# Patient Record
Sex: FEMALE | Race: WHITE | NOT HISPANIC OR LATINO | Employment: OTHER | URBAN - METROPOLITAN AREA
[De-identification: names, ages, dates, MRNs, and addresses within clinical notes are randomized per-mention and may not be internally consistent; named-entity substitution may affect disease eponyms.]

---

## 2020-02-07 ENCOUNTER — HOSPITAL ENCOUNTER (OUTPATIENT)
Dept: MRI IMAGING | Facility: HOSPITAL | Age: 65
Discharge: HOME | End: 2020-02-07

## 2020-06-11 ENCOUNTER — HOSPITAL ENCOUNTER (OUTPATIENT)
Dept: MRI IMAGING | Facility: HOSPITAL | Age: 65
Discharge: HOME | End: 2020-06-11

## 2020-07-07 ENCOUNTER — OFFICE VISIT CONVERTED (OUTPATIENT)
Dept: CARDIOLOGY | Facility: CLINIC | Age: 65
End: 2020-07-07
Attending: SPECIALIST

## 2020-07-07 ENCOUNTER — CONVERSION ENCOUNTER (OUTPATIENT)
Dept: CARDIOLOGY | Facility: CLINIC | Age: 65
End: 2020-07-07

## 2020-07-24 ENCOUNTER — CONVERSION ENCOUNTER (OUTPATIENT)
Dept: CARDIOLOGY | Facility: CLINIC | Age: 65
End: 2020-07-24
Attending: SPECIALIST

## 2020-08-19 ENCOUNTER — HOSPITAL ENCOUNTER (OUTPATIENT)
Dept: NUCLEAR MEDICINE | Facility: HOSPITAL | Age: 65
Discharge: HOME | End: 2020-08-19
Attending: SPECIALIST

## 2021-05-11 NOTE — H&P
History and Physical      Patient Name: Robyn Go   Patient ID: [id number]   Sex: Female   YOB: 1955    Primary Care Provider: Anastasia WALLACE   Referring Provider: Anastasia WALLACE    Visit Date: July 7, 2020    Provider: Dez Riggs MD   Location: [facility]   Location Address: [address] [place] [address], [address]   [place]  [id number]   Location Phone: [phone]          Chief Complaint     Chest pain.       History Of Present Illness  Consult requested by: Anastasia WALLACE   Robyn Go is a 65 year old female with a history of COPD who has been having some chest pain on and off for the last couple of years, substernal, lasts for about 10 minutes, relieves spontaneously, nonexertional, no aggravating or alleviating factors. She has shortness of breath on exertion. No PND. No orthopnea.   PAST MEDICAL HISTORY: Anxiety; COPD; Degenerative disc disease; Hyperlipidemia; Hypertension.   FAMILY HISTORY: Positive for diabetes mellitus, hypertension, and heart disease.   PSYCHOSOCIAL HISTORY: Current smoker of one pack per day. Denies alcohol use. Daily caffeine intake. . Denies mood changes or depression.   CURRENT MEDICATIONS: Albuterol; atorvastatin 40 mg q. h.s.; buprenorphine HCl 8-2 mg sublingual 2 tablets daily; clonidine 0.2 mg daily; duloxetine 60 mg daily; famotidine 40 mg b.i.d.; furosemide 20 mg daily p.r.n. swelling; gabapentin 300 mg t.i.d.; isosorbide mononitrate ER 30 mg daily; Mucinex; Nicotine transdermal patch; Nystatin 100,000 units/gm powder b.i.d.; Ventolin HFA. Dosage and frequency of the medication(s) reviewed with the patient.   ALLERGIES: No known drug allergies.   CHOLESTEROL STATUS: Unknown.       Review of Systems  · Constitutional  o Admits  o : fatigue  o Denies  o : good general health lately, recent weight changes   · Eyes  o Admits  o : blurred vision  · HENT  o Admits  o : hearing loss or ringing, chronic sinus  "problem  o Denies  o : swollen glands in neck  · Cardiovascular  o Admits  o : chest pain, palpitations (fast, fluttering, or skipping beats), swelling (feet, ankles, hands), shortness of breath while walking or lying flat  · Respiratory  o Admits  o : asthma or wheezing, COPD  · Gastrointestinal  o Denies  o : ulcers, nausea or vomiting  · Neurologic  o Admits  o : lightheaded or dizzy  o Denies  o : stroke, headaches  · Musculoskeletal  o Admits  o : joint pain, back pain  · Endocrine  o Admits  o : heat or cold intolerance  o Denies  o : thyroid disease, diabetes, excessive thirst or urination  · Heme-Lymph  o Denies  o : bleeding or bruising tendency, anemia      Vitals  Date Time BP Position Site L\R Cuff Size HR RR TEMP (F) WT  HT  BMI kg/m2 BSA m2 O2 Sat HC       07/07/2020 01:10 /60 Sitting    70 - R   147lbs 16oz 5'  6\" 23.89 1.77           Physical Examination  · Constitutional  o Appearance  o : Awake, alert, cooperative, pleasant.  · Eyes  o Pupils and Irises  o : Pupils equal and reacting to light and accommodation.  · Ears, Nose, Mouth and Throat  o Ears  o : Tympanic membranes are normal.  o Nose  o : Clear with no maxillary tenderness.  o Oral Cavity  o : Clear.  · Neck  o Inspection/Palpation  o : No JVD, bruits, thyromegaly, or adenopathy. Trachea midline. No axillary or cervical lymphadenopathy.  o Thyroid  o : No thyroid enlargement.   · Respiratory  o Inspection of Chest  o : No chest wall deformities, moving equal.  o Auscultation of Lungs  o : Good air entry with vesicular breath sounds.  o Percussion of Chest  o : Resonant bilaterally.   o Palpation of Chest  o : Equal movements bilaterally.  · Cardiovascular  o Heart  o :   § Auscultation of Heart  § : PMI is in the 5th intercostal space. S1 and S2 regular. No S3. No S4.   o Peripheral Vascular System  o :   § Extremities  § : No pedal edema. Peripheral pulses well felt. No cyanosis.  · Gastrointestinal  o Abdominal Examination  o : No " organomegaly, masses, tenderness, bruits, or abnormal pulsations. Bowel sounds are normal.  · Musculoskeletal  o General  o : Muscle strength is normal with normal tone.  · Skin and Subcutaneous Tissue  o General Inspection  o : No rash, petechiae, or suspicious skin lesions. Skin turgor is normal.  · EKG  o EKG  o : Prior EKG reviewed, which shows sinus rhythm, within normal limits.          Assessment     ASSESSMENT & PLAN:    1.  Precordial chest pain and positive risk factors.  In view of her precordial chest pain and positive risk factors,        we will do a Lexiscan stress test to rule out any significant ischemia.  Cannot walk on a treadmill because        of shortness of breath.    2.  Positive nicotine use.  Smoking cessation instructions were discussed with the patient.  3.  Shortness of breath.  We will do an echocardiogram to evaluate left ventricular systolic function.      Dez Riggs MD, [facility]  SHANI:vm             Electronically Signed by: Dinah Colorado-, Other -Author on July 9, 2020 11:35:27 AM  Electronically Co-signed by: Dez Riggs MD -Reviewer on July 13, 2020 08:33:08 AM

## 2021-05-11 NOTE — PROCEDURES
"   Procedure Note      Patient Name: Robyn Go   Patient ID: [id number]   Sex: Female   YOB: 1955    Primary Care Provider: Anastasia WALLACE   Referring Provider: Anastasia WALLACE    Visit Date: July 24, 2020    Provider: Dez Riggs MD   Location: [facility]   Location Address: [address] [place] [address], [address]   [place]  [id number]   Location Phone: [phone]          FINAL REPORT   TRANSTHORACIC ECHOCARDIOGRAM REPORT    Diagnosis: Shortness of breath   Height: 5'6\" Weight: 148 B/P: 120/60 BSA: 1.8   Tech: BNS   MEASUREMENTS:  RVID (Diastole) : RVID. (NORMAL: 0.7 to 2.4 cm max)   LVID (Systole): 3.7 cm (Diastole): 5.3 cm . (NORMAL: 3.7 - 5.4 cm)   Posterior Wall Thickness (Diastole): 0.9 cm. (NORMAL: 0.8 - 1.1 cm)   Septal Thickness (Diastole): 0.8 cm. (NORMAL: 0.7 - 1.2 cm)   LAID (Systole): 3.1 cm. (NORMAL: 1.9 - 3.8 cm)   Aortic Root Diameter (Diastole): 2.7 cm. (NORMAL: 2.0 - 3.7 cm)   DOPPLER:  E/A ratio 1.1 (NORMAL 0.8-2.0)   DT: 158 msec (NORMAL 140-240 msec.)   IVRT 69 m/sec (NORMAL  m/sec.)   E/E': 9 (NORMAL <8 avg.)   COMMENTS:  The patient underwent 2-D, M-Mode, and Doppler examination, including pulse-wave, continuous-wave, and color-flow analysis; the study is technically adequate. The following was observed:   FINDINGS:  AORTIC VALVE: Normal. Tricuspid in appearance with normal central closure.   MITRAL VALVE: Normal. Bicuspid in appearance.   TRICUSPID VALVE: Normal.   PULMONIC VALVE: Not well visualized.   LEFT ATRIUM: Normal. No intracavitary masses or clots seen. LA volume index is 23 mL/m2.   AORTIC ROOT: Normal in size with adequate motion.   LEFT VENTRICLE: Normal left ventricular systolic function. Ejection fraction 60%.   RIGHT ATRIUM: Normal.   RIGHT VENTRICLE: Normal size and function.   PERICARDIUM: Unremarkable. No evidence of effusion.   INFERIOR VENA CAVA: Diameter is 1.3 cm.   DOPPLER: Doppler examination of the aortic, " mitral, tricuspid, and pulmonary valves was performed. Normal pulmonary artery systolic pressure by Doppler. There is trace mitral regurgitation.   Faxed: 07/29/2020      CONCLUSION:  1.  Normal left ventricular systolic function.   2.  Trace mitral regurgitation.      MD SHANI Krueger/scar    This note was transcribed by Larissa Vega.  scar/SHANI  The above service was transcribed by Larissa Vega, and I attest to the accuracy of the note.  SHANI                 Electronically Signed by: Anayeli Vega-, Other -Author on July 29, 2020 08:52:40 AM  Electronically Co-signed by: Dez Riggs MD -Reviewer on August 15, 2020 09:43:48 AM

## 2021-05-15 VITALS
WEIGHT: 148 LBS | SYSTOLIC BLOOD PRESSURE: 120 MMHG | HEIGHT: 66 IN | HEART RATE: 70 BPM | BODY MASS INDEX: 23.78 KG/M2 | DIASTOLIC BLOOD PRESSURE: 60 MMHG

## 2021-07-01 ENCOUNTER — TRANSCRIBE ORDERS (OUTPATIENT)
Dept: ADMINISTRATIVE | Facility: HOSPITAL | Age: 66
End: 2021-07-01

## 2021-07-01 DIAGNOSIS — Z12.31 SCREENING MAMMOGRAM, ENCOUNTER FOR: Primary | ICD-10-CM

## 2022-03-03 ENCOUNTER — TRANSCRIBE ORDERS (OUTPATIENT)
Dept: ADMINISTRATIVE | Facility: HOSPITAL | Age: 67
End: 2022-03-03

## 2022-03-03 DIAGNOSIS — Z12.31 SCREENING MAMMOGRAM, ENCOUNTER FOR: Primary | ICD-10-CM

## 2022-03-10 ENCOUNTER — APPOINTMENT (OUTPATIENT)
Dept: MAMMOGRAPHY | Facility: HOSPITAL | Age: 67
End: 2022-03-10

## 2022-03-24 ENCOUNTER — APPOINTMENT (OUTPATIENT)
Dept: MAMMOGRAPHY | Facility: HOSPITAL | Age: 67
End: 2022-03-24

## 2022-03-30 ENCOUNTER — TRANSCRIBE ORDERS (OUTPATIENT)
Dept: ADMINISTRATIVE | Facility: HOSPITAL | Age: 67
End: 2022-03-30

## 2022-03-30 DIAGNOSIS — M54.50 LOW BACK PAIN, UNSPECIFIED BACK PAIN LATERALITY, UNSPECIFIED CHRONICITY, UNSPECIFIED WHETHER SCIATICA PRESENT: Primary | ICD-10-CM

## 2022-03-30 DIAGNOSIS — M54.2 CERVICALGIA: ICD-10-CM

## 2022-03-31 ENCOUNTER — APPOINTMENT (OUTPATIENT)
Dept: MRI IMAGING | Facility: HOSPITAL | Age: 67
End: 2022-03-31

## 2022-04-01 ENCOUNTER — HOSPITAL ENCOUNTER (OUTPATIENT)
Dept: MRI IMAGING | Facility: HOSPITAL | Age: 67
Discharge: HOME | End: 2022-04-01
Admitting: NURSE PRACTITIONER

## 2022-04-01 DIAGNOSIS — M54.50 LOW BACK PAIN, UNSPECIFIED BACK PAIN LATERALITY, UNSPECIFIED CHRONICITY, UNSPECIFIED WHETHER SCIATICA PRESENT: ICD-10-CM

## 2022-04-01 DIAGNOSIS — M54.2 CERVICALGIA: ICD-10-CM

## 2022-04-01 PROCEDURE — 72148 MRI LUMBAR SPINE W/O DYE: CPT

## 2022-04-27 ENCOUNTER — APPOINTMENT (OUTPATIENT)
Dept: MAMMOGRAPHY | Facility: HOSPITAL | Age: 67
End: 2022-04-27

## 2022-06-08 ENCOUNTER — TRANSCRIBE ORDERS (OUTPATIENT)
Dept: ADMINISTRATIVE | Facility: HOSPITAL | Age: 67
End: 2022-06-08

## 2022-06-08 DIAGNOSIS — M54.12 CERVICAL RADICULOPATHY: Primary | ICD-10-CM

## 2022-06-29 ENCOUNTER — HOSPITAL ENCOUNTER (OUTPATIENT)
Dept: MRI IMAGING | Facility: HOSPITAL | Age: 67
Discharge: HOME | End: 2022-06-29
Admitting: INTERNAL MEDICINE

## 2022-06-29 DIAGNOSIS — M54.12 CERVICAL RADICULOPATHY: ICD-10-CM

## 2022-06-29 PROCEDURE — 72141 MRI NECK SPINE W/O DYE: CPT

## 2022-10-28 ENCOUNTER — TRANSCRIBE ORDERS (OUTPATIENT)
Dept: ADMINISTRATIVE | Facility: HOSPITAL | Age: 67
End: 2022-10-28

## 2022-10-28 DIAGNOSIS — M85.80 OSTEOPENIA AFTER MENOPAUSE: ICD-10-CM

## 2022-10-28 DIAGNOSIS — Z12.31 SCREENING MAMMOGRAM FOR BREAST CANCER: ICD-10-CM

## 2022-10-28 DIAGNOSIS — Z78.0 POST-MENOPAUSE: ICD-10-CM

## 2022-10-28 DIAGNOSIS — Z78.0 OSTEOPENIA AFTER MENOPAUSE: ICD-10-CM

## 2022-10-28 DIAGNOSIS — Z12.2 SCREENING FOR LUNG CANCER: Primary | ICD-10-CM

## 2023-03-15 ENCOUNTER — OFFICE VISIT (OUTPATIENT)
Dept: SURGERY | Facility: CLINIC | Age: 68
End: 2023-03-15
Payer: MEDICARE

## 2023-03-15 ENCOUNTER — PREP FOR SURGERY (OUTPATIENT)
Dept: OTHER | Facility: HOSPITAL | Age: 68
End: 2023-03-15
Payer: MEDICARE

## 2023-03-15 ENCOUNTER — TELEPHONE (OUTPATIENT)
Dept: SURGERY | Facility: CLINIC | Age: 68
End: 2023-03-15

## 2023-03-15 VITALS — WEIGHT: 151 LBS | HEART RATE: 75 BPM | BODY MASS INDEX: 23.7 KG/M2 | HEIGHT: 67 IN

## 2023-03-15 DIAGNOSIS — R19.7 DIARRHEA, UNSPECIFIED TYPE: ICD-10-CM

## 2023-03-15 DIAGNOSIS — R19.5 ABNORMAL STOOL TEST: ICD-10-CM

## 2023-03-15 DIAGNOSIS — K62.5 BRBPR (BRIGHT RED BLOOD PER RECTUM): ICD-10-CM

## 2023-03-15 DIAGNOSIS — K21.00 GASTROESOPHAGEAL REFLUX DISEASE WITH ESOPHAGITIS WITHOUT HEMORRHAGE: Primary | ICD-10-CM

## 2023-03-15 DIAGNOSIS — R10.13 EPIGASTRIC PAIN: Primary | ICD-10-CM

## 2023-03-15 DIAGNOSIS — K21.9 GASTROESOPHAGEAL REFLUX DISEASE WITHOUT ESOPHAGITIS: ICD-10-CM

## 2023-03-15 DIAGNOSIS — R10.13 EPIGASTRIC PAIN: ICD-10-CM

## 2023-03-15 DIAGNOSIS — R11.2 NAUSEA WITH VOMITING: ICD-10-CM

## 2023-03-15 DIAGNOSIS — R11.2 NAUSEA AND VOMITING, UNSPECIFIED VOMITING TYPE: ICD-10-CM

## 2023-03-15 PROCEDURE — 1160F RVW MEDS BY RX/DR IN RCRD: CPT | Performed by: NURSE PRACTITIONER

## 2023-03-15 PROCEDURE — 1159F MED LIST DOCD IN RCRD: CPT | Performed by: NURSE PRACTITIONER

## 2023-03-15 PROCEDURE — 99213 OFFICE O/P EST LOW 20 MIN: CPT | Performed by: NURSE PRACTITIONER

## 2023-03-15 RX ORDER — PEG-3350, SODIUM SULFATE, SODIUM CHLORIDE, POTASSIUM CHLORIDE, SODIUM ASCORBATE AND ASCORBIC ACID 7.5-2.691G
1000 KIT ORAL ONCE
Qty: 1000 ML | Refills: 0 | Status: SHIPPED | OUTPATIENT
Start: 2023-03-15 | End: 2023-03-15

## 2023-03-15 RX ORDER — METHYLPREDNISOLONE 4 MG/1
TABLET ORAL
COMMUNITY
Start: 2023-03-01

## 2023-03-15 RX ORDER — ATORVASTATIN CALCIUM 40 MG/1
TABLET, FILM COATED ORAL
COMMUNITY
Start: 2023-03-01

## 2023-03-15 RX ORDER — AZITHROMYCIN 250 MG/1
TABLET, FILM COATED ORAL
COMMUNITY
Start: 2023-03-01

## 2023-03-15 NOTE — PROGRESS NOTES
Chief Complaint: Colonoscopy (consult)    Subjective    EGD and colonoscopy consultation         History of Present Illness  Robyn Go is a 68 y.o. female presents to [facility] for EGD and colonoscopy consultation.    Patient presents today on referral from Padma Parry for an EGD and colonoscopy consultation.  Patient's daughter is present for consultation.    Patient reports that she has been having heartburn and indigestion for over a year despite taking omeprazole and famotidine.  She denies any dysphagia.  Patient is with epigastric and right upper quadrant pain.  She admits to pain after eating.  Patient reports that she will have nausea and vomiting about 20 to 30 minutes after eating.  Her daughter reports that she is only eating 1 small meal a day, for the fear of vomiting.  Patient reports that she has had a decrease in her appetite.    Patient reports that she has seen some bright red blood per rectum, that is significant enough to fill the toilet.  She admits to constipation issues.  Admits to lower abdominal pain.  Patient's daughter reports that she has had diarrhea for over a year.  Patient was with a positive Cologuard.    8/13: Colonoscopy ([facility]): Rectum - hyperplastic.    Objective     Past Medical History:   Diagnosis Date   • Arthritis    • Bronchitis    • Kidney stone    • Osteoporosis        Past Surgical History:   Procedure Laterality Date   • HYSTERECTOMY         Outpatient Medications Marked as Taking for the 3/15/23 encounter (Office Visit) with Megan Tobar, APRSAJI   Medication Sig Dispense Refill   • azithromycin (ZITHROMAX) 250 MG tablet      • DULoxetine (CYMBALTA) 60 MG capsule      • famotidine (PEPCID) 40 MG tablet      • furosemide (LASIX) 20 MG tablet      • isosorbide mononitrate (IMDUR) 30 MG 24 hr tablet      • nitroglycerin (NITROSTAT) 0.4 MG SL tablet      • omeprazole (priLOSEC) 40 MG capsule          No Known Allergies  "    Family History   Problem Relation Age of Onset   • Kidney disease Mother    • Diabetes Mother    • Heart disease Mother    • Heart disease Father        Social History     Socioeconomic History   • Marital status:    Tobacco Use   • Smoking status: Former     Types: Cigarettes     Quit date: 3/1/2023     Years since quittin.0   • Smokeless tobacco: Never   Vaping Use   • Vaping Use: Never used   Substance and Sexual Activity   • Alcohol use: Never   • Drug use: Never   • Sexual activity: Defer       Review of Systems   Constitutional: Negative for chills and fever.   HENT: Negative for trouble swallowing.    Gastrointestinal: Positive for abdominal pain, anal bleeding, constipation, diarrhea, nausea, vomiting, GERD and indigestion. Negative for abdominal distention, blood in stool and rectal pain.        Vital Signs:   Pulse 75   Ht 170.2 cm (67\")   Wt 68.5 kg (151 lb)   BMI 23.65 kg/m²      Physical Exam  Vitals and nursing note reviewed.   Constitutional:       General: She is not in acute distress.  HENT:      Head: Normocephalic.   Cardiovascular:      Rate and Rhythm: Normal rate.   Pulmonary:      Effort: Pulmonary effort is normal.      Breath sounds: No stridor.   Abdominal:      Palpations: Abdomen is soft.      Tenderness: There is abdominal tenderness. There is guarding.   Musculoskeletal:         General: No deformity. Normal range of motion.   Skin:     General: Skin is warm and dry.      Coloration: Skin is not jaundiced.   Neurological:      General: No focal deficit present.      Mental Status: She is alert and oriented to person, place, and time.   Psychiatric:         Mood and Affect: Mood normal.         Thought Content: Thought content normal.          Result Review :          [x]  Laboratory  []  Radiology  []  Pathology  []  Microbiology  []  EKG/Telemetry   [x]  Cardiology/Vascular   [x]  Old records  I spent 25 minutes caring for Robyn on this date of service. This time " includes time spent by me in the following activities: reviewing tests, obtaining and/or reviewing a separately obtained history, performing a medically appropriate examination and/or evaluation, ordering medications, tests, or procedures, and documenting information in the medical record.     Assessment and Plan    Diagnoses and all orders for this visit:    1. Epigastric pain (Primary)  -     CT Abdomen Pelvis With & Without Contrast; Future    2. Gastroesophageal reflux disease without esophagitis    3. Nausea and vomiting, unspecified vomiting type    4. BRBPR (bright red blood per rectum)    5. Abnormal stool test    6. Diarrhea, unspecified type    Other orders  -     PEG-KCl-NaCl-NaSulf-Na Asc-C (MOVIPREP) 100 g reconstituted solution powder; Take 1,000 mL by mouth 1 (One) Time for 1 dose.  Dispense: 1000 mL; Refill: 0        Follow Up   Return for Schedule EGD and colonoscopy with Dr. Pollard on 4/25/2023 at [facility].     Phone PAT.  Hospital call with arrival time.    Possible risks/complications, benefits, and alternatives to surgical or invasive procedure have been explained to patient and/or legal guardian.     Patient has been evaluated and can tolerate anesthesia and/or sedation. Risks, benefits, and alternatives to anesthesia and sedation have been explained to patient and/or legal guardian.  Patient verbalizes understanding and is willing to proceed with above plan.    Patient was given instructions and counseling regarding her condition or for health maintenance advice. Please see specific information pulled into the AVS if appropriate.

## 2023-03-15 NOTE — TELEPHONE ENCOUNTER
I lmom for Pt to call the office back. I have the Pt scheduled at [facility] on 3/21/23 for her CT scan. Pt needs to be there at 6:45 pm and nothing by mouth 2 hours prior.

## 2023-03-21 ENCOUNTER — HOSPITAL ENCOUNTER (OUTPATIENT)
Dept: CT IMAGING | Facility: HOSPITAL | Age: 68
Discharge: HOME | End: 2023-03-21
Admitting: NURSE PRACTITIONER
Payer: MEDICARE

## 2023-03-21 DIAGNOSIS — R10.13 EPIGASTRIC PAIN: ICD-10-CM

## 2023-03-21 LAB
CREAT BLDA-MCNC: 0.8 MG/DL
EGFRCR SERPLBLD CKD-EPI 2021: 81.4 ML/MIN/1.73

## 2023-03-21 PROCEDURE — 74177 CT ABD & PELVIS W/CONTRAST: CPT

## 2023-03-21 PROCEDURE — 82565 ASSAY OF CREATININE: CPT

## 2023-03-21 PROCEDURE — 25510000001 IOPAMIDOL PER 1 ML: Performed by: NURSE PRACTITIONER

## 2023-03-21 RX ADMIN — IOPAMIDOL 93 ML: 755 INJECTION, SOLUTION INTRAVENOUS at 19:22

## 2023-03-27 ENCOUNTER — TELEPHONE (OUTPATIENT)
Dept: SURGERY | Facility: CLINIC | Age: 68
End: 2023-03-27
Payer: MEDICARE

## 2023-03-27 NOTE — TELEPHONE ENCOUNTER
I have called and spoke with this patient. Ct is wnl limit from GI standpoint. Instructed to colonoscopy appts.

## 2023-04-07 ENCOUNTER — APPOINTMENT (OUTPATIENT)
Dept: BONE DENSITY | Facility: HOSPITAL | Age: 68
End: 2023-04-07
Payer: MEDICARE

## 2023-04-07 ENCOUNTER — APPOINTMENT (OUTPATIENT)
Dept: MAMMOGRAPHY | Facility: HOSPITAL | Age: 68
End: 2023-04-07
Payer: MEDICARE

## 2023-04-07 ENCOUNTER — HOSPITAL ENCOUNTER (OUTPATIENT)
Dept: CT IMAGING | Facility: HOSPITAL | Age: 68
Discharge: HOME | End: 2023-04-07
Admitting: NURSE PRACTITIONER
Payer: MEDICARE

## 2023-04-07 DIAGNOSIS — Z12.2 SCREENING FOR LUNG CANCER: ICD-10-CM

## 2023-04-07 PROCEDURE — 71271 CT THORAX LUNG CANCER SCR C-: CPT

## 2023-04-20 NOTE — PRE-PROCEDURE INSTRUCTIONS
Left message with a call-back number and the arrival-time of 0600.      Instructed to follow directions from the office to prepare for this appointment.      Hold all medications the morning of the procedure.  Instructed to bring all medications and any inhalers to the hospital on the day of the procedure.      Take any nebulizer treatments on the morning of the procedure as well.    Instructed that a licensed  will be needed for post-procedure transport home.    Padma Nguyen RN    4/20 @ 5360    Arrival-time of 0600.    Must have a  for transportation home post-procedure.    Patient has paperwork from the office, however doesn't have it with her to confirm which bowel prep she is to take.  Made sure she has call-back number, advised to call me with any questions when she has time to read over the paperwork.    Do not take any morning medications on the day of the procedure.  Instead bring all prescribed medications to the hospital on the morning of the procedure.     Patient verbalized understanding of instructions.    Padma Nguyen RN

## 2023-04-25 ENCOUNTER — ANESTHESIA EVENT (OUTPATIENT)
Dept: GASTROENTEROLOGY | Facility: HOSPITAL | Age: 68
End: 2023-04-25
Payer: MEDICARE

## 2023-04-25 ENCOUNTER — ANESTHESIA (OUTPATIENT)
Dept: GASTROENTEROLOGY | Facility: HOSPITAL | Age: 68
End: 2023-04-25
Payer: MEDICARE

## 2023-04-25 ENCOUNTER — HOSPITAL ENCOUNTER (OUTPATIENT)
Facility: HOSPITAL | Age: 68
Setting detail: HOSPITAL OUTPATIENT SURGERY
Discharge: HOME | End: 2023-04-25
Attending: SURGERY | Admitting: SURGERY
Payer: MEDICARE

## 2023-04-25 VITALS
BODY MASS INDEX: 23.27 KG/M2 | TEMPERATURE: 98.9 F | RESPIRATION RATE: 22 BRPM | HEART RATE: 72 BPM | DIASTOLIC BLOOD PRESSURE: 62 MMHG | OXYGEN SATURATION: 95 % | WEIGHT: 148.59 LBS | SYSTOLIC BLOOD PRESSURE: 106 MMHG

## 2023-04-25 DIAGNOSIS — R11.2 NAUSEA WITH VOMITING: ICD-10-CM

## 2023-04-25 DIAGNOSIS — R19.5 ABNORMAL STOOL TEST: ICD-10-CM

## 2023-04-25 DIAGNOSIS — K21.00 GASTROESOPHAGEAL REFLUX DISEASE WITH ESOPHAGITIS WITHOUT HEMORRHAGE: ICD-10-CM

## 2023-04-25 DIAGNOSIS — R10.13 EPIGASTRIC PAIN: ICD-10-CM

## 2023-04-25 PROCEDURE — 88305 TISSUE EXAM BY PATHOLOGIST: CPT | Performed by: SURGERY

## 2023-04-25 PROCEDURE — 25010000002 PROPOFOL 10 MG/ML EMULSION: Performed by: NURSE ANESTHETIST, CERTIFIED REGISTERED

## 2023-04-25 RX ORDER — SODIUM CHLORIDE, SODIUM LACTATE, POTASSIUM CHLORIDE, CALCIUM CHLORIDE 600; 310; 30; 20 MG/100ML; MG/100ML; MG/100ML; MG/100ML
1000 INJECTION, SOLUTION INTRAVENOUS CONTINUOUS
Status: DISCONTINUED | OUTPATIENT
Start: 2023-04-25 | End: 2023-04-25 | Stop reason: HOSPADM

## 2023-04-25 RX ORDER — LIDOCAINE HYDROCHLORIDE 20 MG/ML
INJECTION, SOLUTION EPIDURAL; INFILTRATION; INTRACAUDAL; PERINEURAL AS NEEDED
Status: DISCONTINUED | OUTPATIENT
Start: 2023-04-25 | End: 2023-04-25 | Stop reason: SURG

## 2023-04-25 RX ORDER — EPHEDRINE SULFATE 50 MG/ML
INJECTION, SOLUTION INTRAVENOUS AS NEEDED
Status: DISCONTINUED | OUTPATIENT
Start: 2023-04-25 | End: 2023-04-25 | Stop reason: SURG

## 2023-04-25 RX ORDER — PROPOFOL 10 MG/ML
VIAL (ML) INTRAVENOUS AS NEEDED
Status: DISCONTINUED | OUTPATIENT
Start: 2023-04-25 | End: 2023-04-25 | Stop reason: SURG

## 2023-04-25 RX ORDER — SODIUM CHLORIDE, SODIUM LACTATE, POTASSIUM CHLORIDE, CALCIUM CHLORIDE 600; 310; 30; 20 MG/100ML; MG/100ML; MG/100ML; MG/100ML
30 INJECTION, SOLUTION INTRAVENOUS CONTINUOUS
Status: DISCONTINUED | OUTPATIENT
Start: 2023-04-25 | End: 2023-04-25 | Stop reason: HOSPADM

## 2023-04-25 RX ADMIN — LIDOCAINE HYDROCHLORIDE 100 MG: 20 INJECTION, SOLUTION EPIDURAL; INFILTRATION; INTRACAUDAL; PERINEURAL at 07:32

## 2023-04-25 RX ADMIN — EPHEDRINE SULFATE 5 MG: 50 INJECTION INTRAVENOUS at 08:05

## 2023-04-25 RX ADMIN — PROPOFOL 200 MCG/KG/MIN: 10 INJECTION, EMULSION INTRAVENOUS at 07:32

## 2023-04-25 RX ADMIN — EPHEDRINE SULFATE 5 MG: 50 INJECTION INTRAVENOUS at 07:58

## 2023-04-25 RX ADMIN — SODIUM CHLORIDE, POTASSIUM CHLORIDE, SODIUM LACTATE AND CALCIUM CHLORIDE: 600; 310; 30; 20 INJECTION, SOLUTION INTRAVENOUS at 07:31

## 2023-04-25 RX ADMIN — SODIUM CHLORIDE, POTASSIUM CHLORIDE, SODIUM LACTATE AND CALCIUM CHLORIDE 1000 ML: 600; 310; 30; 20 INJECTION, SOLUTION INTRAVENOUS at 06:54

## 2023-04-25 RX ADMIN — PROPOFOL 50 MG: 10 INJECTION, EMULSION INTRAVENOUS at 07:32

## 2023-04-25 NOTE — H&P
General Surgery/Colorectal Surgery Note    Patient Name:  Robyn Go  YOB: 1955  [id number]    Referring Provider: Vincent Pollard, *      Patient Care Team:  Anastasia Manzo APRN as PCP - General (Nurse Practitioner)    Subjective .     History of present illness:    HPI   referral from Padma Parry for an EGD and colonoscopy consultation.  Patient's daughter is present for consultation.     Patient reports that she has been having heartburn and indigestion for over a year despite taking omeprazole and famotidine.  She denies any dysphagia.  Patient is with epigastric and right upper quadrant pain.  She admits to pain after eating.  Patient reports that she will have nausea and vomiting about 20 to 30 minutes after eating.  Her daughter reports that she is only eating 1 small meal a day, for the fear of vomiting.  Patient reports that she has had a decrease in her appetite.     Patient reports that she has seen some bright red blood per rectum, that is significant enough to fill the toilet.  She admits to constipation issues.  Admits to lower abdominal pain.  Patient's daughter reports that she has had diarrhea for over a year.  Patient was with a positive Cologuard.     : Colonoscopy ([facility]): Rectum - hyperplastic.    History:  Past Medical History:   Diagnosis Date   • Arthritis    • Bronchitis    • Cancer     OVARIAN   • GERD (gastroesophageal reflux disease)    • Hyperlipidemia    • Hypertension    • Kidney stone    • Osteoporosis        Past Surgical History:   Procedure Laterality Date   • HYSTERECTOMY         Family History   Problem Relation Age of Onset   • Kidney disease Mother    • Diabetes Mother    • Heart disease Mother    • Heart disease Father        Social History     Tobacco Use   • Smoking status: Former     Packs/day: 1.50     Years: 50.00     Pack years: 75.00     Types: Cigarettes     Quit date: 3/1/2023     Years since quittin.1   • Smokeless  tobacco: Never   Vaping Use   • Vaping Use: Every day   • Substances: Nicotine   Substance Use Topics   • Alcohol use: Never   • Drug use: Never       Review of Systems: See HPI    Review of Systems            Medications Prior to Admission   Medication Sig Dispense Refill Last Dose   • DULoxetine (CYMBALTA) 60 MG capsule Take 1 capsule by mouth Daily.   4/24/2023   • famotidine (PEPCID) 40 MG tablet Take 1 tablet by mouth Daily.   Past Week   • atorvastatin (LIPITOR) 40 MG tablet  (Patient not taking: Reported on 3/15/2023)   More than a month   • furosemide (LASIX) 20 MG tablet Take 1 tablet by mouth Daily As Needed.   More than a month   • isosorbide mononitrate (IMDUR) 30 MG 24 hr tablet    More than a month   • nitroglycerin (NITROSTAT) 0.4 MG SL tablet    More than a month         Home Meds:      Prior to Admission medications    Medication Sig Start Date End Date Taking? Authorizing Provider   DULoxetine (CYMBALTA) 60 MG capsule Take 1 capsule by mouth Daily. 12/14/22  Yes Mena Ahmadi MD   famotidine (PEPCID) 40 MG tablet Take 1 tablet by mouth Daily. 12/14/22  Yes Mena Ahmadi MD   atorvastatin (LIPITOR) 40 MG tablet  3/1/23   Mena Ahmadi MD   furosemide (LASIX) 20 MG tablet Take 1 tablet by mouth Daily As Needed. 12/14/22   Mena Ahmadi MD   isosorbide mononitrate (IMDUR) 30 MG 24 hr tablet  12/14/22   Mena Ahmadi MD   nitroglycerin (NITROSTAT) 0.4 MG SL tablet  12/14/22   ProviderMena MD   azithromycin (ZITHROMAX) 250 MG tablet  3/1/23 4/25/23  Mena Ahmadi MD   fluconazole (DIFLUCAN) 150 MG tablet Take one tablet now and repeat in 10 days  Patient not taking: Reported on 3/15/2023 12/30/22 4/25/23  Radha Madrid APRN   methylPREDNISolone (MEDROL) 4 MG dose pack  3/1/23 4/25/23  ProviderMena MD   omeprazole (priLOSEC) 40 MG capsule  10/12/22 4/25/23  ProviderMena MD            Allergies:  Patient has no known  allergies.      Objective     Vital Signs   Temp:  [98.3 °F (36.8 °C)] 98.3 °F (36.8 °C)  Heart Rate:  [66] 66  Resp:  [16] 16  BP: (105)/(67) 105/67    Physical Exam:     Physical Exam    NAD, A/O x 4, normal circulation, normal respiration      Result Review :     Assessment & Plan     There are no diagnoses linked to this encounter.       Risks including bleeding, perforation, pain, infection, missed polyp(s). Benefits, and alternatives of EGD and Colonoscopy discussed with patient.  All questions answered.  Consent verified.         Vincent Pollard MD  04/25/23 07:22 EDT

## 2023-04-25 NOTE — ANESTHESIA PREPROCEDURE EVALUATION
Anesthesia Evaluation     Patient summary reviewed and Nursing notes reviewed   no history of anesthetic complications:  NPO Solid Status: > 8 hours  NPO Liquid Status: > 2 hours           Airway   Mallampati: II  TM distance: >3 FB  Neck ROM: full  No difficulty expected  Dental    (+) edentulous    Pulmonary - normal exam    breath sounds clear to auscultation  (+) a smoker Former,   Cardiovascular - normal exam  Exercise tolerance: good (4-7 METS)    Rhythm: regular  Rate: normal    (+) hypertension, hyperlipidemia,       Neuro/Psych- negative ROS  GI/Hepatic/Renal/Endo    (+)  GERD,  renal disease stones,     Musculoskeletal     Abdominal    Substance History - negative use     OB/GYN negative ob/gyn ROS         Other   arthritis,    history of cancer    ROS/Med Hx Other: PAT Nursing Notes unavailable.                   Anesthesia Plan    ASA 2     general and MAC     (Patient understands anesthesia not responsible for dental damage.)  intravenous induction     Anesthetic plan, risks, benefits, and alternatives have been provided, discussed and informed consent has been obtained with: patient.    Use of blood products discussed with patient .   Plan discussed with CRNA.        CODE STATUS:

## 2023-04-25 NOTE — ANESTHESIA POSTPROCEDURE EVALUATION
Patient: Robyn Go    Procedure Summary     Date: 04/25/23 Room / Location: [facility] ENDOSCOPY 3 / [facility] ENDOSCOPY    Anesthesia Start: 0731 Anesthesia Stop: 0807    Procedures:       COLONOSCOPY WITH BIOPSIES AND COLD SNARE POLYPECTOMIESD      ESOPHAGOGASTRODUODENOSCOPY WITH BIOPSIES Diagnosis:       Gastroesophageal reflux disease with esophagitis without hemorrhage      Epigastric pain      Nausea with vomiting      Abnormal stool test      (Gastroesophageal reflux disease with esophagitis without hemorrhage [K21.00])      (Epigastric pain [R10.13])      (Nausea with vomiting [R11.2])      (Abnormal stool test [R19.5])    Surgeons: Vincent Pollard MD Provider: Debra Curry MD    Anesthesia Type: general, MAC ASA Status: 2          Anesthesia Type: general, MAC    Vitals  Vitals Value Taken Time   BP 75/44 04/25/23 0806   Temp     Pulse 63 04/25/23 0808   Resp     SpO2 92 % 04/25/23 0808   Vitals shown include unvalidated device data.        Post Anesthesia Care and Evaluation    Patient location during evaluation: bedside  Patient participation: complete - patient participated  Level of consciousness: awake  Pain management: adequate    Airway patency: patent  PONV Status: none  Cardiovascular status: acceptable and stable  Respiratory status: acceptable  Hydration status: acceptable    Comments: An Anesthesiologist personally participated in the most demanding procedures (including induction and emergence if applicable) in the anesthesia plan, monitored the course of anesthesia administration at frequent intervals and remained physically present and available for immediate diagnosis and treatment of emergencies.

## 2023-04-26 LAB
CYTO UR: NORMAL
LAB AP CASE REPORT: NORMAL
LAB AP CLINICAL INFORMATION: NORMAL
PATH REPORT.FINAL DX SPEC: NORMAL
PATH REPORT.GROSS SPEC: NORMAL

## 2024-07-09 ENCOUNTER — TRANSCRIBE ORDERS (OUTPATIENT)
Dept: ADMINISTRATIVE | Facility: HOSPITAL | Age: 69
End: 2024-07-09
Payer: MEDICARE

## 2024-07-09 DIAGNOSIS — Z12.31 SCREENING MAMMOGRAM, ENCOUNTER FOR: ICD-10-CM

## 2024-07-09 DIAGNOSIS — Z87.891 PERSONAL HISTORY OF TOBACCO USE, PRESENTING HAZARDS TO HEALTH: ICD-10-CM

## 2024-07-09 DIAGNOSIS — Z78.0 MENOPAUSE: ICD-10-CM

## 2024-07-09 DIAGNOSIS — Z13.820 SPECIAL SCREENING FOR OSTEOPOROSIS: Primary | ICD-10-CM

## 2024-09-27 ENCOUNTER — HOSPITAL ENCOUNTER (OUTPATIENT)
Dept: MAMMOGRAPHY | Facility: HOSPITAL | Age: 69
Discharge: HOME | End: 2024-09-27
Payer: MEDICARE

## 2024-09-27 ENCOUNTER — HOSPITAL ENCOUNTER (OUTPATIENT)
Dept: BONE DENSITY | Facility: HOSPITAL | Age: 69
Discharge: HOME | End: 2024-09-27
Payer: MEDICARE

## 2024-09-27 ENCOUNTER — HOSPITAL ENCOUNTER (OUTPATIENT)
Dept: CT IMAGING | Facility: HOSPITAL | Age: 69
Discharge: HOME | End: 2024-09-27
Payer: MEDICARE

## 2024-09-27 DIAGNOSIS — Z87.891 PERSONAL HISTORY OF TOBACCO USE, PRESENTING HAZARDS TO HEALTH: ICD-10-CM

## 2024-09-27 DIAGNOSIS — Z78.0 MENOPAUSE: ICD-10-CM

## 2024-09-27 DIAGNOSIS — Z12.31 SCREENING MAMMOGRAM, ENCOUNTER FOR: ICD-10-CM

## 2024-09-27 DIAGNOSIS — Z13.820 SPECIAL SCREENING FOR OSTEOPOROSIS: ICD-10-CM

## 2024-09-27 PROCEDURE — 77063 BREAST TOMOSYNTHESIS BI: CPT

## 2024-09-27 PROCEDURE — 77067 SCR MAMMO BI INCL CAD: CPT

## 2024-09-27 PROCEDURE — 77080 DXA BONE DENSITY AXIAL: CPT

## 2024-09-27 PROCEDURE — 71271 CT THORAX LUNG CANCER SCR C-: CPT

## (undated) DEVICE — STERILE POLYISOPRENE POWDER-FREE SURGICAL GLOVES WITH EMOLLIENT COATING: Brand: PROTEXIS

## (undated) DEVICE — SNAR POLYP CAPTIFLEX XS/OVL 11X2.4MM 240CM 1P/U

## (undated) DEVICE — BLCK/BITE BLOX WO/DENTL/RIM W/STRAP 54F

## (undated) DEVICE — Device: Brand: DEFENDO AIR/WATER/SUCTION AND BIOPSY VALVE

## (undated) DEVICE — SOLIDIFIER LIQLOC PLS 1500CC BT

## (undated) DEVICE — Device

## (undated) DEVICE — TUBING, SUCTION, 1/4" X 10', STRAIGHT: Brand: MEDLINE

## (undated) DEVICE — LINER SURG CANSTR SXN S/RIGD 1500CC

## (undated) DEVICE — SOL IRR H2O BTL 1000ML STRL

## (undated) DEVICE — CONN JET HYDRA H20 AUXILIARY DISP

## (undated) DEVICE — THE SINGLE USE ETRAP – POLYP TRAP IS USED FOR SUCTION RETRIEVAL OF ENDOSCOPICALLY REMOVED POLYPS.: Brand: ETRAP

## (undated) DEVICE — SOL IRRG H2O PL/BG 1000ML STRL

## (undated) DEVICE — STERILE POLYISOPRENE POWDER-FREE SURGICAL GLOVES: Brand: PROTEXIS

## (undated) DEVICE — SINGLE-USE BIOPSY FORCEPS: Brand: RADIAL JAW 4